# Patient Record
Sex: MALE | Race: WHITE | NOT HISPANIC OR LATINO | URBAN - METROPOLITAN AREA
[De-identification: names, ages, dates, MRNs, and addresses within clinical notes are randomized per-mention and may not be internally consistent; named-entity substitution may affect disease eponyms.]

---

## 2024-01-01 ENCOUNTER — EMERGENCY (EMERGENCY)
Facility: HOSPITAL | Age: 74
LOS: 0 days | End: 2024-08-13
Attending: EMERGENCY MEDICINE
Payer: MEDICARE

## 2024-01-01 VITALS
WEIGHT: 199.96 LBS | SYSTOLIC BLOOD PRESSURE: 125 MMHG | TEMPERATURE: 98 F | OXYGEN SATURATION: 96 % | RESPIRATION RATE: 18 BRPM | DIASTOLIC BLOOD PRESSURE: 73 MMHG | HEART RATE: 76 BPM

## 2024-01-01 VITALS — SYSTOLIC BLOOD PRESSURE: 79 MMHG | DIASTOLIC BLOOD PRESSURE: 51 MMHG

## 2024-01-01 DIAGNOSIS — I10 ESSENTIAL (PRIMARY) HYPERTENSION: ICD-10-CM

## 2024-01-01 DIAGNOSIS — I46.9 CARDIAC ARREST, CAUSE UNSPECIFIED: ICD-10-CM

## 2024-01-01 DIAGNOSIS — Z90.79 ACQUIRED ABSENCE OF OTHER GENITAL ORGAN(S): ICD-10-CM

## 2024-01-01 DIAGNOSIS — R11.0 NAUSEA: ICD-10-CM

## 2024-01-01 DIAGNOSIS — R51.9 HEADACHE, UNSPECIFIED: ICD-10-CM

## 2024-01-01 DIAGNOSIS — E78.5 HYPERLIPIDEMIA, UNSPECIFIED: ICD-10-CM

## 2024-01-01 DIAGNOSIS — Z87.442 PERSONAL HISTORY OF URINARY CALCULI: ICD-10-CM

## 2024-01-01 LAB — GLUCOSE BLDC GLUCOMTR-MCNC: 192 MG/DL — HIGH (ref 70–99)

## 2024-01-01 PROCEDURE — 99285 EMERGENCY DEPT VISIT HI MDM: CPT | Mod: 25,GC

## 2024-01-01 PROCEDURE — 71045 X-RAY EXAM CHEST 1 VIEW: CPT | Mod: 26

## 2024-01-01 PROCEDURE — 92950 HEART/LUNG RESUSCITATION CPR: CPT

## 2024-01-01 PROCEDURE — 99291 CRITICAL CARE FIRST HOUR: CPT | Mod: 25

## 2024-01-01 PROCEDURE — 92950 HEART/LUNG RESUSCITATION CPR: CPT | Mod: XU

## 2024-01-01 PROCEDURE — 36680 INSERT NEEDLE BONE CAVITY: CPT | Mod: XU

## 2024-01-01 PROCEDURE — 31500 INSERT EMERGENCY AIRWAY: CPT

## 2024-01-01 PROCEDURE — 82962 GLUCOSE BLOOD TEST: CPT

## 2024-01-01 PROCEDURE — 71045 X-RAY EXAM CHEST 1 VIEW: CPT

## 2024-01-01 PROCEDURE — 31500 INSERT EMERGENCY AIRWAY: CPT | Mod: XU

## 2024-01-01 RX ORDER — METOCLOPRAMIDE HCL 5 MG/ML
10 VIAL (ML) INJECTION ONCE
Refills: 0 | Status: DISCONTINUED | OUTPATIENT
Start: 2024-01-01 | End: 2024-01-01

## 2024-01-01 RX ORDER — FAMOTIDINE 40 MG/1
20 TABLET, FILM COATED ORAL ONCE
Refills: 0 | Status: DISCONTINUED | OUTPATIENT
Start: 2024-01-01 | End: 2024-01-01

## 2024-01-01 RX ORDER — MAGNESIUM, ALUMINUM HYDROXIDE 200-225/5
30 SUSPENSION, ORAL (FINAL DOSE FORM) ORAL EVERY 6 HOURS
Refills: 0 | Status: DISCONTINUED | OUTPATIENT
Start: 2024-01-01 | End: 2024-01-01

## 2024-08-13 NOTE — ED PROVIDER NOTE - OBJECTIVE STATEMENT
74M with PMH of HTN, HLD, prostate CA s/p resection, kidney stones, presents to ED for evaluation of HA x2 days associated with nausea and dry heaving today. Pt reports gradual onset of diffuse HA described as pressure that is constant, worsening, non-radiating, associated with mild photophobia. (+)Associated nausea that started yesterday and dry heaving today. Reports no recent trauma, fever/chills, cough/congestion, CP, SOB, palpitations, abdominal pain, vomiting, diarrhea, black or bloody stools, urinary sx, rash, extremity swelling/numbness/weakness/paresthesias. Former smoker quit >40 years ago, denies alcohol or illicit drug use. Follows with PMD Dr. Dao Foster in [place] but does not have a cardiologist. Has had normal stress test 1.5 years ago but no other cardiac imaging. 74M with PMH of HTN, HLD, prostate CA s/p resection, kidney stones, presents to ED for evaluation of HA x2 days associated with nausea and dry heaving today. Pt reports gradual onset of diffuse HA described as pressure that is constant, worsening, non-radiating, associated with mild photophobia. (+)Associated midsternal chest pressure and nausea that started yesterday and dry heaving today. Reports no recent trauma, fever/chills, cough/congestion, SOB, palpitations, abdominal pain, vomiting, diarrhea, black or bloody stools, urinary sx, rash, extremity swelling/numbness/weakness/paresthesias. Former smoker quit >40 years ago, denies alcohol or illicit drug use. Follows with PMD Dr. Dao Foster in [place] but does not have a cardiologist. Has had normal stress test 1.5 years ago but no other cardiac imaging.

## 2024-08-13 NOTE — ED PROVIDER NOTE - PROGRESS NOTE DETAILS
[place]ER[place]: Initially on my exam patient appeared uncomfortable but in no acute distress, with a otherwise normal exam and normal neurological exam. Labs, CXR, EKG and CT were ordered. Notified by nurse that during blood draw patient complained of pain and then went unresponsive, lips blue with no pulse palpable, CPR initiated immediately.  Patient underwent 44 minutes of ACLS with refractory V-fib.  Given epi, calcium, magnesium, amiodarone, bicarb, without resolution.  Placed on dual sequentials without resolution.  Stellate ganglion block performed, patient started on esmolol drip.  PEA achieved after 38 minutes of CPR but patient never achieved ROSC and time of death was called at 1652.

## 2024-08-13 NOTE — ED ADULT NURSE REASSESSMENT NOTE - NS ED NURSE REASSESS COMMENT FT1
witnessed unresponsiveness at 16:06; pt was talking to nurse, joking around while she was putting IV in; all of a sudden, pt stated "I don't feel good", rolled head back in stretcher, rolled eyes, went grey and started breathing heavily, +pulse at this time. "All hands" immediately called, rolled to monitored spot in front of nursing station, where pulse was lost and CPR initiated.

## 2024-08-13 NOTE — ED PROVIDER NOTE - CLINICAL SUMMARY MEDICAL DECISION MAKING FREE TEXT BOX
74-year-old male past medical history of hypertension, dyslipidemia, kidney stones, prostate cancer status postresection presents to the ED for evaluation of headache for the past 2 days.  Patient had gradual onset of global headache that is worsening over the past 2 days.  Patient was nauseous and had some dry heaving today and symptoms were worse than yesterday, so wife took him to the hospital today.  Wife is monitoring vital signs at home and patient had mildly elevated blood pressure and is reporting mild photophobia.  Report epigastric pain radiating up to his chest.  No fever, chills, recent illness, neck stiffness.  Patient had a normal stress test a year and a half ago and has no cardiac history.    Patient was seen and evaluated and vital signs were reviewed.  Initial blood pressure 125/73, heart rate normal, afebrile, no respiratory distress.  Patient was examined.  Patient nonfocal neurological exam and no meningeal signs.  Patient had clear lungs and normal heart sounds.  Patient has soft abdomen with no guarding, rebound, rigidity.  Orders were placed for labs, EKG, head CT, chest x-ray.  While nurses were getting IV access, patient said he immediately did not feel well and patient became unresponsive.  ACLS was immediately initiated and fingerstick taken.  Patient was initially ventilated by BVM and later an LMA was placed with strong chest rise and bilateral breath sounds with ventilation.  Patient was found to be in refractory ventricular fibrillation and had continued ACLS, multiple doses of epinephrine, calcium, dual sequential defibrillation, esmolol, stellate ganglion block.  Patient had excellent quality CPR and was moving extremities initially during CPR.  Patient converted to PEA and multiple bedside echo was done and patient had no significant contractility or heart movement.  Patient's wife at patient's bedside during initial episode of unresponsiveness and was outside of the room and was spoken to during resuscitation.  Despite optimal efforts in the emergency department, patient did not have ROSC and was pronounced dead at 1652.  Wife spoken to and exact cause of arrest was unknown, but underlying etiology may have been cardiac (e.g. MI), aorta related, from an obstructive process, neurologic, etc. patient does not qualify as a medical examiner case and wife does not wish to pursue autopsy.

## 2024-08-13 NOTE — ED PROVIDER NOTE - PHYSICAL EXAMINATION
VITAL SIGNS: I have reviewed nursing notes and confirm.  CONSTITUTIONAL: Well-developed; well-nourished; in no acute distress.  SKIN: Skin exam is warm and dry, no acute rash.  EYES: PERRL, conjunctiva and sclera clear.  ENT: mmm  CARD: S1, S2 normal; no murmurs, gallops, or rubs. Regular rate and rhythm.  RESP: Normal respiratory effort, no tachypnea or distress. Lungs CTAB, no wheezes, rales or rhonchi.  ABD: soft, NT/ND.  EXT: Strength 5/5 x4 extremities, sensation intact and equal. Normal ROM. No clubbing, cyanosis or edema.  NEURO: Alert, oriented. Grossly unremarkable. No focal deficits.  PSYCH: Cooperative, appropriate. VITAL SIGNS: I have reviewed nursing notes and confirm.  CONSTITUTIONAL: patient is holding his head, uncomfortable appearing but in no acute distress.  SKIN: Skin exam is warm and dry, no acute rash.  EYES: PERRL, conjunctiva and sclera clear.  ENT: mmm  CARD: S1, S2 normal; no murmurs, gallops, or rubs. Regular rate and rhythm. 2+ symmetric radial pulses  RESP: Normal respiratory effort, no tachypnea or distress. Lungs CTAB, no wheezes, rales or rhonchi.  ABD: soft, NT/ND.  EXT: Strength 5/5 x4 extremities, sensation intact and equal. Normal ROM. No clubbing, cyanosis or edema.  NEURO: Alert, oriented. Grossly unremarkable. No focal deficits.  PSYCH: Cooperative, appropriate. VITAL SIGNS: I have reviewed nursing notes and confirm.  CONSTITUTIONAL: patient is holding his head, uncomfortable appearing but in no acute distress.  SKIN: Skin exam is warm and dry, no acute rash.  EYES: PERRL, conjunctiva and sclera clear.  ENT: mm  CARD: S1, S2 normal; no murmurs, gallops, or rubs. Regular rate and rhythm. 2+ symmetric radial pulses  RESP: Normal respiratory effort, no tachypnea or distress. Lungs CTAB, no wheezes, rales or rhonchi.  ABD: soft, NT/ND.  EXT:  Normal ROM. No clubbing, cyanosis or edema.  Neuro: A&Ox3, normal speech, CN II-XII intact, FROM & strength 5/5 x4 extremities. Sensation intact and equal. no pronator drift, no dysmetria on finger to nose b/l.   PSYCH: Cooperative, appropriate. VITAL SIGNS: I have reviewed nursing notes and confirm.  CONSTITUTIONAL: patient is holding his head, uncomfortable appearing but in no acute distress.  SKIN: Skin exam is warm and dry, no acute rash.  EYES: PERRL, conjunctiva and sclera clear.  ENT: mm  CARD: S1, S2 normal; no murmurs, gallops, or rubs. Regular rate and rhythm. 2+ symmetric radial pulses  RESP: Normal respiratory effort, no tachypnea or distress. Lungs CTAB, no wheezes, rales or rhonchi.  chest wall non-tender, no skin changes  ABD: soft, NT/ND, no guarding/rigidity.  EXT:  Normal ROM. No clubbing, cyanosis or edema.  Neuro: A&Ox3, normal speech, CN II-XII intact, FROM & strength 5/5 x4 extremities. Sensation intact and equal. no pronator drift, no dysmetria on finger to nose b/l.   PSYCH: Cooperative, appropriate.

## 2024-08-13 NOTE — ED PROVIDER NOTE - NSICDXPASTMEDICALHX_GEN_ALL_CORE_FT
PAST MEDICAL HISTORY:  HLD (hyperlipidemia)     HTN (hypertension)      PAST MEDICAL HISTORY:  HLD (hyperlipidemia)     HTN (hypertension)     Kidney stones     Prostate cancer

## 2024-08-13 NOTE — ED PROVIDER NOTE - ATTENDING CONTRIBUTION TO CARE
I personally evaluated patient. I agree with the findings and plan with all documentation on chart except as documented  in my note.    74-year-old male past medical history of hypertension, dyslipidemia, kidney stones, prostate cancer status postresection presents to the ED for evaluation of headache for the past 2 days.  Patient had gradual onset of global headache that is worsening over the past 2 days.  Patient was nauseous and had some dry heaving today and symptoms were worse than yesterday, so wife took him to the hospital today.  Wife is monitoring vital signs at home and patient had mildly elevated blood pressure and is reporting mild photophobia.  Report epigastric pain radiating up to his chest.  No fever, chills, recent illness, neck stiffness.  Patient had a normal stress test a year and a half ago and has no cardiac history.    Patient was seen and evaluated and vital signs were reviewed.  Initial blood pressure 125/73, heart rate normal, afebrile, no respiratory distress.  Patient was examined.  Patient nonfocal neurological exam and no meningeal signs.  Patient had clear lungs and normal heart sounds.  Patient has soft abdomen with no guarding, rebound, rigidity.  Orders were placed for labs, EKG, head CT, chest x-ray.  While nurses were getting IV access, patient said he immediately did not feel well and patient became unresponsive.  ACLS was immediately initiated and fingerstick taken.  Patient was initially ventilated by BVM and later an LMA was placed with strong chest rise and bilateral breath sounds with ventilation.  Patient was found to be in refractory ventricular fibrillation and had continued ACLS, multiple doses of epinephrine, calcium, dual sequential defibrillation, esmolol, stellate ganglion block.  Patient had excellent quality CPR and was moving extremities initially during CPR.  Patient converted to PEA and multiple bedside echo was done and patient had no significant contractility or heart movement.  Patient's wife at patient's bedside during initial episode of unresponsiveness and was outside of the room and was spoken to during resuscitation.  Despite optimal efforts in the emergency department, patient did not have ROSC and was pronounced dead at 1652.  Wife spoken to and exact cause of arrest was unknown, but underlying etiology may have been cardiac (e.g. MI), aorta related, from an obstructive process, neurologic, etc. patient does not qualify as a medical examiner case and wife does not wish to pursue autopsy.